# Patient Record
Sex: MALE | Race: WHITE | Employment: OTHER | URBAN - METROPOLITAN AREA
[De-identification: names, ages, dates, MRNs, and addresses within clinical notes are randomized per-mention and may not be internally consistent; named-entity substitution may affect disease eponyms.]

---

## 2017-10-31 PROBLEM — G89.29 CHRONIC BACK PAIN: Status: ACTIVE | Noted: 2017-10-31

## 2017-10-31 PROBLEM — M54.9 CHRONIC BACK PAIN: Status: ACTIVE | Noted: 2017-10-31

## 2022-05-16 ENCOUNTER — TELEPHONE (OUTPATIENT)
Dept: ADMINISTRATIVE | Age: 72
End: 2022-05-16

## 2022-05-16 NOTE — TELEPHONE ENCOUNTER
Patient Appointment Form:      PCP: Carlos A Pham  Referring: Carlos A Pham    Has the Patient:    Seen a Cardiologist? yes    date:2017  [de-identified]  location:[place]    Had a heart catheterization? no    Had heart surgery?  yes   date:  1/28/2010   surgeon: Sanam Mason   [facility] name:  [facility]    Had a stress test or nuclear stress test? yes   date: feb 2012   [facility]    Had an echocardiogram? yes   date: feb 2012   [facility]    Had a vascular ultrasound? no    Had a 24/48 heart monitor or extended cardiac event monitor? no    Had recent blood work in the last 6 months? yes    date: 5/10/2022    ordering physician: Carlos A Pham    Had a pacemaker/ICD/ILR implant? no    Seen an Electrophysiologist? no        Will send records via: fax      Date & time of appointment:  6/23/2022  10:40 Dr Ybarra Pleasant

## 2022-06-22 ENCOUNTER — TELEPHONE (OUTPATIENT)
Dept: ADMINISTRATIVE | Age: 72
End: 2022-06-22

## 2022-08-26 ENCOUNTER — OFFICE VISIT (OUTPATIENT)
Dept: CARDIOLOGY CLINIC | Age: 72
End: 2022-08-26
Payer: MEDICARE

## 2022-08-26 VITALS
SYSTOLIC BLOOD PRESSURE: 120 MMHG | RESPIRATION RATE: 16 BRPM | HEART RATE: 52 BPM | WEIGHT: 134.6 LBS | HEIGHT: 67 IN | BODY MASS INDEX: 21.12 KG/M2 | DIASTOLIC BLOOD PRESSURE: 80 MMHG

## 2022-08-26 DIAGNOSIS — R01.1 MURMUR: ICD-10-CM

## 2022-08-26 DIAGNOSIS — I25.10 CORONARY ARTERY DISEASE INVOLVING NATIVE CORONARY ARTERY OF NATIVE HEART WITHOUT ANGINA PECTORIS: Primary | ICD-10-CM

## 2022-08-26 DIAGNOSIS — R06.09 DOE (DYSPNEA ON EXERTION): ICD-10-CM

## 2022-08-26 PROCEDURE — 99204 OFFICE O/P NEW MOD 45 MIN: CPT | Performed by: INTERNAL MEDICINE

## 2022-08-26 PROCEDURE — 93000 ELECTROCARDIOGRAM COMPLETE: CPT | Performed by: INTERNAL MEDICINE

## 2022-08-26 NOTE — PROGRESS NOTES
Ladarius Samson  1950  Date of Service: 8/26/2022    Reason for Consultation: We were asked to see Ladarius Samson by Dr. Brunilda Starks MD  regarding dyspnea, CAD. History of Chief Complaint: This is a 66 y.o. male with a history of coronary artery disease. He has stents in the circumflex and RCA. He also has a history of hypertension, diabetes, hypercholesterolemia, lumbar stenosis, and a carotid endarterectomy. He states that he has noticed dyspnea on exertion with performing higher levels of physical activities over the past 1 to 2 months. He denies any chest discomfort, orthopnea/PND. He denies any palpitations, or syncope, or near syncope. REVIEW OF SYSTEMS:  As above. See patient questionair for further review of systems. CURRENT MEDICATIONS:  Current Outpatient Medications   Medication Sig Dispense Refill    HYDROcodone-acetaminophen (NORCO)  MG per tablet Take 1 tablet by mouth 5 times daily for 30 days. 150 tablet 0    cetirizine (ZYRTEC) 10 MG tablet Take 10 mg by mouth daily      famotidine (PEPCID) 20 MG tablet TAKE ONE TABLET BY MOUTH TWICE A DAY AS NEEDED WITH FOOD FOR STOMACH- REPLACES RANITIDINE      amLODIPine (NORVASC) 2.5 MG tablet Take 2.5 mg by mouth in the morning and at bedtime      sildenafil (VIAGRA) 100 MG tablet Take 100 mg by mouth as needed for Erectile Dysfunction      cyclobenzaprine (FLEXERIL) 10 MG tablet TAKE ONE TABLET BY MOUTH THREE TIMES A DAY AS NEEDED  1    Tens Unit MISC by Does not apply route 1 tens unit with supplies. DX: M48.061 M54.9 1 each 0    Cholecalciferol (VITAMIN D PO) Take 1 tablet by mouth daily      aspirin 81 MG tablet Take 81 mg by mouth in the morning and at bedtime      metoprolol (TOPROL XL) 25 MG XL tablet Take 1 tablet by mouth daily. (Patient taking differently: Take 12.5 mg by mouth 2 times daily) 90 tablet 3    atorvastatin (LIPITOR) 80 MG tablet Take 80 mg by mouth daily.       metFORMIN (GLUCOPHAGE) 500 MG tablet or lymphadenopathy. HEART:  Regular rate and rhythm. Normal S1 and S2. There is a mid peaking 2/6 systolic ejection murmur. LUNGS:  Clear to auscultation bilaterally. No use of accessory muscles. ABDOMEN:  Soft, non-tender. Normal bowel sounds. EXTREMITIES:  Full ROM x4. No bilateral lower extremity edema. Good distal pulses. EYES:  PERRL, normal lids & conjunctiva. No icterus. ENT: no external masses, no bleeding. Moist mucosa. Normal lips formation. NEURO: Full ROM x 4, EOMI, no tremors. SKIN:  Warm, dry and intact. Normal turgor, no petechia. Phych: alert & oriented x3. Normal  Judgement & insight. Currently not agitated or anxious. EKG: Sinus bradycardia, 52 bpm, nl axis, nonspecific ST - T wave changes. Assessment:   Dyspnea on exertion  Murmur consistent with aortic stenosis  Coronary artery disease hypertension, well controlled today. Diabetes  Lumbar stenosis  Right carotid endarterectomy      Recommendations:  Echocardiogram  Lexiscan Cardiolite stress test.    Thank you for allowing me to participate in your patient's care. [address] [facility] [place], [facility]  Interventional Cardiology    Note: This report was completed using computerized voice recognition software. Every effort has been made to ensure accuracy, however; and invert and computerized transcription errors may be present.

## 2022-10-05 ENCOUNTER — TELEPHONE (OUTPATIENT)
Dept: CARDIOLOGY | Age: 72
End: 2022-10-05

## 2022-10-05 NOTE — TELEPHONE ENCOUNTER
Spoke with patient and confirmed Lexiscan stress test appointment on October 7, 2022 at 0700. Instructions for test,hold Norvasc,Toprol and glucophage, and COVID-19 preprocedure information reviewed with patient, questions answered. Patient verbalized understanding.

## 2022-10-07 ENCOUNTER — TELEPHONE (OUTPATIENT)
Dept: CARDIOLOGY CLINIC | Age: 72
End: 2022-10-07

## 2022-10-07 ENCOUNTER — HOSPITAL ENCOUNTER (OUTPATIENT)
Dept: CARDIOLOGY | Age: 72
Discharge: HOME | End: 2022-10-07
Payer: MEDICARE

## 2022-10-07 VITALS
RESPIRATION RATE: 12 BRPM | WEIGHT: 134 LBS | HEART RATE: 64 BPM | HEIGHT: 67 IN | SYSTOLIC BLOOD PRESSURE: 138 MMHG | BODY MASS INDEX: 21.03 KG/M2 | DIASTOLIC BLOOD PRESSURE: 70 MMHG

## 2022-10-07 DIAGNOSIS — R01.1 MURMUR: ICD-10-CM

## 2022-10-07 DIAGNOSIS — I25.10 CORONARY ARTERY DISEASE INVOLVING NATIVE CORONARY ARTERY OF NATIVE HEART WITHOUT ANGINA PECTORIS: ICD-10-CM

## 2022-10-07 DIAGNOSIS — R06.09 DOE (DYSPNEA ON EXERTION): ICD-10-CM

## 2022-10-07 LAB
LV EF: 60 %
LVEF MODALITY: NORMAL

## 2022-10-07 PROCEDURE — 2580000003 HC RX 258: Performed by: INTERNAL MEDICINE

## 2022-10-07 PROCEDURE — 78452 HT MUSCLE IMAGE SPECT MULT: CPT

## 2022-10-07 PROCEDURE — 93017 CV STRESS TEST TRACING ONLY: CPT

## 2022-10-07 PROCEDURE — A9500 TC99M SESTAMIBI: HCPCS | Performed by: INTERNAL MEDICINE

## 2022-10-07 PROCEDURE — 6360000002 HC RX W HCPCS: Performed by: INTERNAL MEDICINE

## 2022-10-07 PROCEDURE — 3430000000 HC RX DIAGNOSTIC RADIOPHARMACEUTICAL: Performed by: INTERNAL MEDICINE

## 2022-10-07 PROCEDURE — 93306 TTE W/DOPPLER COMPLETE: CPT

## 2022-10-07 RX ORDER — SODIUM CHLORIDE 0.9 % (FLUSH) 0.9 %
10 SYRINGE (ML) INJECTION PRN
Status: DISCONTINUED | OUTPATIENT
Start: 2022-10-07 | End: 2022-10-08 | Stop reason: HOSPADM

## 2022-10-07 RX ADMIN — REGADENOSON 0.4 MG: 0.08 INJECTION, SOLUTION INTRAVENOUS at 08:45

## 2022-10-07 RX ADMIN — Medication 9.8 MILLICURIE: at 07:11

## 2022-10-07 RX ADMIN — SODIUM CHLORIDE, PRESERVATIVE FREE 10 ML: 5 INJECTION INTRAVENOUS at 08:46

## 2022-10-07 RX ADMIN — SODIUM CHLORIDE, PRESERVATIVE FREE 10 ML: 5 INJECTION INTRAVENOUS at 08:45

## 2022-10-07 RX ADMIN — SODIUM CHLORIDE, PRESERVATIVE FREE 10 ML: 5 INJECTION INTRAVENOUS at 07:11

## 2022-10-07 RX ADMIN — Medication 31.6 MILLICURIE: at 08:45

## 2022-10-07 NOTE — DISCHARGE INSTRUCTIONS
[address]      Instructions to Patients    The following are the instructions for patients who have had a procedure in our office today. Patient name: Codie Cody    Radionuclide Activity: 40mCi of 99mTc-Tetrofosmin    Date Administered: 10/7/2022    Expires: 48 hours after scheduled appointment time      Patient may resume normal activity unless otherwise instructed. Patient may resume medications as normal.  If the need should arise, patient may call [phone] between the hours of 7:00am-3:00pm.  After hours there is at least one physician on-call at all times for those patients needing assistance. Patients may call [phone] and the answering service will direct the patient to one of our physicians for assistance. After the patient's test if they are going to be leaving from an airport in the near future they should take this letter with them to verify the test and radionuclide used for their test.      This letter verifies that the above named bearer received an injection of a radionuclide for medical purpose/usage only.         Electronically signed by Letitia Nam on 10/7/2022 at 8:45 AM

## 2022-10-07 NOTE — PROCEDURES
[address],[facility] [address] [phone]                Pharmacologic Stress Nuclear Gated SPECT Study    Name: Soledad Castillo. Account Number: [de-identified]    :  1950          Sex: male         Date of Study:  10/7/2022    Height: 5' 7\" (170.2 cm)         Weight: 134 lb (60.8 kg)     Ordering Provider: Channing Cuenca DO          PCP: Eda Lu MD      Cardiologist: Channing Cuenca DO             Interpreting Physician: Channing Cuenca DO  _________________________________________________________________________________    Indication:   Evaluation of extent and severity of coronary artery disease    Clinical History:   Patient has prior history of coronary artery disease. Resting ECG:    Sinus rhythm, 59 bpm.  Normal axis. Nonspecific ST-T waves. Procedure:   Pharmacologic stress testing was performed with regadenoson 0.4 mg for 15 seconds. The heart rate was 59 at baseline and rin to 101 beats during the infusion. This corresponds to 69% of maximum predicted heart rate. The blood pressure at baseline was 138/70 and blood pressure at the end of infusion was 122/74. Blood pressure response was normal during the stress procedure. The patient experienced mild shortness of breath and lightheadedness during the infusion. ECG during the infusion did not change. IMAGING: Myocardial perfusion imaging was performed at rest 30-35 minutes following the intravenous injection of 9.8 mCi of (Tc-Sestamibi) followed by 10 ml of Normal Saline. As per infusion protocol, the patient was injected intravenously with 31.6 mCi of (Tc-Sestamibi) followed by 10 ml of Normal Saline. Gated post-stress tomographic imaging was performed 20-25 minutes after stress. FINDINGS: The overall quality of the study was fair.      Left ventricular cavity size was noted to be normal.    Rotational analog analysis demonstrated no patient motion or abnormal extracardiac radioactivity and soft tissue diaphragmatic attenuation. The gated SPECT stress imaging in the short, vertical long, and horizontal long axis demonstrated     A moderate defect was present in the basal inferoseptal and mid inferoseptal wall(s) that was  moderate sized by quantification. The resting images show no change. Gated SPECT left ventricular ejection fraction was calculated to be 66%, with normal myocardial thickening and wall motion. Impression:    ECG during the infusion did not change. The myocardial perfusion imaging was normal with attenuation artifact. Overall left ventricular systolic function was normal without regional wall motion abnormalities. Low risk general pharmacologic stress test.    Thank you for sending your patient to this [employer].      Electronically signed by Rosemary Ruiz DO on 10/7/22 at 1:03 PM EDT

## 2022-10-10 ENCOUNTER — TELEPHONE (OUTPATIENT)
Dept: CARDIOLOGY CLINIC | Age: 72
End: 2022-10-10

## 2022-10-10 PROBLEM — I35.0 NONRHEUMATIC AORTIC VALVE STENOSIS: Status: ACTIVE | Noted: 2022-10-10

## 2022-10-10 RX ORDER — METOPROLOL SUCCINATE 25 MG/1
12.5 TABLET, EXTENDED RELEASE ORAL DAILY
COMMUNITY

## 2022-10-10 NOTE — TELEPHONE ENCOUNTER
----- Message from Matthew Barriga DO sent at 10/10/2022 10:18 AM EDT -----  I discussed his echo results with him. He has moderate aortic stenosis. He is to decrease his metoprolol from 12.5 mg twice a day to 12.5 mg once a day. He should follow-up in 3 months.

## 2023-01-17 ENCOUNTER — TELEPHONE (OUTPATIENT)
Dept: CARDIOLOGY CLINIC | Age: 73
End: 2023-01-17

## 2023-01-17 ENCOUNTER — OFFICE VISIT (OUTPATIENT)
Dept: CARDIOLOGY CLINIC | Age: 73
End: 2023-01-17
Payer: MEDICARE

## 2023-01-17 VITALS
SYSTOLIC BLOOD PRESSURE: 142 MMHG | DIASTOLIC BLOOD PRESSURE: 78 MMHG | HEIGHT: 67 IN | WEIGHT: 143 LBS | HEART RATE: 62 BPM | BODY MASS INDEX: 22.44 KG/M2

## 2023-01-17 DIAGNOSIS — I25.10 CORONARY ARTERY DISEASE INVOLVING NATIVE CORONARY ARTERY OF NATIVE HEART WITHOUT ANGINA PECTORIS: Primary | ICD-10-CM

## 2023-01-17 PROCEDURE — 1123F ACP DISCUSS/DSCN MKR DOCD: CPT | Performed by: INTERNAL MEDICINE

## 2023-01-17 PROCEDURE — 99214 OFFICE O/P EST MOD 30 MIN: CPT | Performed by: INTERNAL MEDICINE

## 2023-01-17 PROCEDURE — 93000 ELECTROCARDIOGRAM COMPLETE: CPT | Performed by: INTERNAL MEDICINE

## 2023-01-17 PROCEDURE — 3077F SYST BP >= 140 MM HG: CPT | Performed by: INTERNAL MEDICINE

## 2023-01-17 PROCEDURE — 3078F DIAST BP <80 MM HG: CPT | Performed by: INTERNAL MEDICINE

## 2023-01-17 NOTE — TELEPHONE ENCOUNTER
Per Dr. Judy Grant, patient needs referral to pulmonary of PCP's choice re: COPD. Left message for medical staff at Dr. Bunny Melendrez office to call me.

## 2023-01-18 NOTE — PROGRESS NOTES
Kelly Mortensen  1950  Date of Service: 1/17/2023    Patient Active Problem List    Diagnosis Date Noted    Nonrheumatic aortic valve stenosis 10/10/2022     Priority: Medium     Overview Note:     Moderate on echo 10-22. Chronic back pain 10/31/2017    Spondylolisthesis of lumbar region 07/14/2016    HTN (hypertension)     Agent orange exposure     Lumbar spinal stenosis L3-4, L4-5, L5-S1 05/30/2013    Synovial cyst of lumbar spine L4-5 left 05/30/2013    PVD (peripheral vascular disease) ([address]      Overview Note:     Right carotid endarterectomy 10/3/07, left carotid occlusion. CAD (coronary artery disease)      Overview Note:     Adenosine infused stress test October 4, 2007, negative for ischemia. Status post cardiac catheterization, Dr. Geremias Hanley 12/16/08: coronary artery disease. Left main, mild luminal irregularities. Left anterior descending: mid vessel 30-40% stenosis  Left circumflex: proximal 80-90% stenosis. Right coronary artery: proximal 70% stenosis with scattered 30-40% stenosis in a codominant vessel. 2008: Successful PTCA, left circumflex proximal 80-90% stenosis to residual normal appearing lumen placing a 3.0x15 mm long [place] drug-eluting stent. Right coronary artery proximal 70% stenosis to residual normal appearing lumen placing a 2.5x12 mm long [place] drug-eluting stent. (1-28-10) 2.5 mm RUKHSANA mid RCA      Hyperlipidemia     Smoker     Displacement of cervical intervertebral disc without myelopathy 12/06/2004    Spinal stenosis in cervical region 12/06/2004       Social History     Socioeconomic History    Marital status:    Tobacco Use    Smoking status: Every Day     Packs/day: 1.00     Types: Cigarettes    Smokeless tobacco: Never   Vaping Use    Vaping Use: Never used   Substance and Sexual Activity    Alcohol use: Yes     Alcohol/week: 3.0 standard drinks     Types: 3 Standard drinks or equivalent per week     Comment: very little    Drug use:  No Current Outpatient Medications   Medication Sig Dispense Refill    HYDROcodone-acetaminophen (NORCO)  MG per tablet Take 1 tablet by mouth 5 times daily for 30 days. 150 tablet 0    metoprolol succinate (TOPROL XL) 25 MG extended release tablet Take 12.5 mg by mouth daily      famotidine (PEPCID) 20 MG tablet TAKE ONE TABLET BY MOUTH TWICE A DAY AS NEEDED WITH FOOD FOR STOMACH- REPLACES RANITIDINE      amLODIPine (NORVASC) 2.5 MG tablet Take 2.5 mg by mouth in the morning and at bedtime      sildenafil (VIAGRA) 100 MG tablet Take 100 mg by mouth as needed for Erectile Dysfunction      cyclobenzaprine (FLEXERIL) 10 MG tablet TAKE ONE TABLET BY MOUTH THREE TIMES A DAY AS NEEDED  1    Tens Unit MISC by Does not apply route 1 tens unit with supplies. DX: M48.061 M54.9 1 each 0    Cholecalciferol (VITAMIN D PO) Take 1 tablet by mouth daily      aspirin 81 MG tablet Take 81 mg by mouth in the morning and at bedtime      atorvastatin (LIPITOR) 80 MG tablet Take 80 mg by mouth daily. metFORMIN (GLUCOPHAGE) 500 MG tablet Take 500 mg by mouth 2 times daily (with meals) Indications: takes 1/2 of a pill bid      gabapentin (NEURONTIN) 300 MG capsule Take 300 mg by mouth 3 times daily. clopidogrel (PLAVIX) 75 MG tablet Take 75 mg by mouth daily. No current facility-administered medications for this visit. Allergies   Allergen Reactions    Simvastatin Myalgia       Chief Complaint:  Nain Guevara is here today for follow up and management/recomendations for aortic stenosis, coronary artery disease. History of Present Illness: Nain Guevara states that He does household chores, goes up the stairs, does yard work & goes shopping. He continues to have the same chronic dyspnea on exertion that has not changed. He states that he had the flu about 1-1/2 weeks ago. Since then he has had some dizziness especially when looking upward or standing up.   He denies any chest discomfort, orthopnea/PND, or lower extremity edema. He denies any palpitations. REVIEW OF SYSTEMS:  As above. Patient does not complain of any fever, chills, nausea, vomiting or diarrhea. No focal, motor or neurological deficits. No changes in his/her vision, hearing, bowel or bladder habits. He is not known to have a history of thyroid problems. No recent nose bleeds. PHYSICAL EXAM:  Vitals:    01/17/23 0922   BP: (!) 142/78   Pulse: 62   Weight: 143 lb (64.9 kg)   Height: 5' 7\" (1.702 m)       GENERAL:  He is alert and oriented x 3, communicates well, in no distress. NECK:  No masses, trachea is mid position. Supple, full ROM, no JVD or bruits. No palpable thyromegaly or lymphadenopathy. HEART:  Regular rate and rhythm. Normal S1 and S2. There is a 1-2 over 6 mid peaking systolic ejection murmur. LUNGS:  Clear to auscultation bilaterally. Poor air movement. No use of accessory muscles. symmetrical excursion. ABDOMEN:  Soft, non-tender. Normal bowel sounds. EXTREMITIES:  Full ROM x 4. No bilateral lower extremity edema. Good distal pulses. EYES:  Extraocular muscles intact. PERRL. Normal lids & conjunctiva. ENT:  Nares are clear & not bleeding. Moist mucosa. Normal lips formation. No external masses   NEURO: no tremors, full ROM x 4, EOMI. SKIN:  Warm, dry and intact. Normal turgor. EKG: Sinus rhythm, 62 bpm, nl axis, nonspecific ST - T wave changes. Assessment:   Coronary artery disease as outlined above. Clinically no symptoms of recurring ischemia at this time. Moderate aortic stenosis  Hypertension, well controled at this time. Hypercholesterolemia  Right carotid endarterectomy. He states that the left carotid artery is closed. Agent orange exposure  COPD on exam.  He also showed me a CAT scan of his chest report that he just had done. This states at least moderate emphysema.   He does not move air well on exam.  His pulmonary pressures are moderately elevated on his echo.  He also states that he recently had pulmonary function tests performed at the [place] and was told that those were abnormal as well. Recommendations:  I discussed with him that I would like his blood pressure to be a little bit lower. However, I will not lower the blood pressure while he still having some dizziness. I suspect the dizziness when he looks upward is residual from his flu. We discussed that if this does not resolve over time he may consider seeing ENT. I was going to order PFTs on him. That is what he told me that he just had PFTs performed at the [place] and they were abnormal.  With his poor air movement on exam, at least moderate emphysema on his chest CT, and abnormal PFTs I discussed with him that I would recommend seeing pulmonology for his persistent dyspnea. He does have moderate aortic stenosis but it is only moderate and the mean gradient is only 18 mmHg. Thank you for allowing me to participate in your patient's care. [address], [address] [facility]  Interventional Cardiology    Note: This report was completed using computerized voice recognition software. Every effort has been made to ensure accuracy, however; and invert and computerized transcription errors may be present.

## 2023-01-24 NOTE — TELEPHONE ENCOUNTER
Michelle in Dr. Amalia Evans notified of Dr. Dante Badillo recommendation. Notes faxed to [phone] .

## 2023-04-26 ENCOUNTER — OFFICE VISIT (OUTPATIENT)
Dept: CARDIOLOGY CLINIC | Age: 73
End: 2023-04-26
Payer: MEDICARE

## 2023-04-26 VITALS
SYSTOLIC BLOOD PRESSURE: 132 MMHG | HEART RATE: 55 BPM | RESPIRATION RATE: 14 BRPM | HEIGHT: 68 IN | WEIGHT: 142 LBS | BODY MASS INDEX: 21.52 KG/M2 | DIASTOLIC BLOOD PRESSURE: 70 MMHG

## 2023-04-26 DIAGNOSIS — I25.10 CORONARY ARTERY DISEASE INVOLVING NATIVE CORONARY ARTERY OF NATIVE HEART WITHOUT ANGINA PECTORIS: Primary | Chronic | ICD-10-CM

## 2023-04-26 PROCEDURE — 93000 ELECTROCARDIOGRAM COMPLETE: CPT | Performed by: INTERNAL MEDICINE

## 2023-04-26 PROCEDURE — 1123F ACP DISCUSS/DSCN MKR DOCD: CPT | Performed by: INTERNAL MEDICINE

## 2023-04-26 PROCEDURE — 99214 OFFICE O/P EST MOD 30 MIN: CPT | Performed by: INTERNAL MEDICINE

## 2023-04-26 PROCEDURE — 3078F DIAST BP <80 MM HG: CPT | Performed by: INTERNAL MEDICINE

## 2023-04-26 PROCEDURE — 3075F SYST BP GE 130 - 139MM HG: CPT | Performed by: INTERNAL MEDICINE

## 2023-04-26 RX ORDER — MAGNESIUM OXIDE 420 MG/1
1 TABLET ORAL DAILY
COMMUNITY
Start: 2023-04-10

## 2023-04-26 RX ORDER — AMLODIPINE BESYLATE 5 MG/1
7.5 TABLET ORAL DAILY
Qty: 135 TABLET | Refills: 3 | Status: SHIPPED | OUTPATIENT
Start: 2023-04-26

## 2023-04-26 RX ORDER — UMECLIDINIUM BROMIDE AND VILANTEROL TRIFENATATE 62.5; 25 UG/1; UG/1
1 POWDER RESPIRATORY (INHALATION) DAILY
COMMUNITY

## 2023-04-26 NOTE — PROGRESS NOTES
Chief Complaint:  Matt Chery is here today for follow up and management/recomendations for aortic stenosis, coronary artery disease. History of Present Illness: Matt Chery states that He does household chores, goes up the stairs, does yard work & goes shopping. He continues to have the same chronic dyspnea on exertion with higher levels of activities that has not changed. He states that this has not changed for years. He denies any chest discomfort, orthopnea/PND, or lower extremity edema. He denies any palpitations. REVIEW OF SYSTEMS:  As above. Patient does not complain of any fever, chills, nausea, vomiting or diarrhea. No focal, motor or neurological deficits. No changes in his/her vision, hearing, bowel or bladder habits. He is not known to have a history of thyroid problems. No recent nose bleeds. PHYSICAL EXAM:  Vitals:    04/26/23 0754   BP: 132/70   Pulse: 55   Resp: 14   Weight: 142 lb (64.4 kg)   Height: 5' 8\" (1.727 m)       GENERAL:  He is alert and oriented x 3, communicates well, in no distress. NECK:  No masses, trachea is mid position. Supple, full ROM, no JVD. Left greater than right bruits. No palpable thyromegaly or lymphadenopathy. HEART:  Regular rate and rhythm. Normal S1 and S2. There is a I-II/VI mid peaking systolic ejection murmur. LUNGS:  Clear to auscultation bilaterally. Poor air movement. No use of accessory muscles. symmetrical excursion. ABDOMEN:  Soft, non-tender. Normal bowel sounds. EXTREMITIES:  Full ROM x 4. No bilateral lower extremity edema on exam.  Good distal pulses. EYES:  Extraocular muscles intact. PERRL. Normal lids & conjunctiva. ENT:  Nares are clear & not bleeding. Moist mucosa. Normal lips formation. No external masses   NEURO: no tremors, full ROM x 4, EOMI. SKIN:  Warm, dry and intact. Normal turgor. EKG: Sinus rhythm, 55 bpm, nl axis, nonspecific ST - T wave changes.       Assessment:   Coronary artery

## 2023-05-28 ENCOUNTER — APPOINTMENT (OUTPATIENT)
Dept: GENERAL RADIOLOGY | Age: 73
End: 2023-05-28
Payer: MEDICARE

## 2023-05-28 ENCOUNTER — HOSPITAL ENCOUNTER (EMERGENCY)
Age: 73
Discharge: HOME | End: 2023-05-29
Payer: MEDICARE

## 2023-05-28 DIAGNOSIS — J18.9 PNEUMONIA OF RIGHT LUNG DUE TO INFECTIOUS ORGANISM, UNSPECIFIED PART OF LUNG: Primary | ICD-10-CM

## 2023-05-28 PROCEDURE — 99284 EMERGENCY DEPT VISIT MOD MDM: CPT

## 2023-05-28 PROCEDURE — 71046 X-RAY EXAM CHEST 2 VIEWS: CPT

## 2023-05-28 PROCEDURE — 87635 SARS-COV-2 COVID-19 AMP PRB: CPT

## 2023-05-28 PROCEDURE — 87502 INFLUENZA DNA AMP PROBE: CPT

## 2023-05-28 PROCEDURE — 6370000000 HC RX 637 (ALT 250 FOR IP): Performed by: NURSE PRACTITIONER

## 2023-05-28 PROCEDURE — 94664 DEMO&/EVAL PT USE INHALER: CPT

## 2023-05-28 PROCEDURE — 87880 STREP A ASSAY W/OPTIC: CPT

## 2023-05-28 RX ORDER — IPRATROPIUM BROMIDE AND ALBUTEROL SULFATE 2.5; .5 MG/3ML; MG/3ML
1 SOLUTION RESPIRATORY (INHALATION) ONCE
Status: COMPLETED | OUTPATIENT
Start: 2023-05-28 | End: 2023-05-28

## 2023-05-28 RX ORDER — ACETAMINOPHEN 325 MG/1
650 TABLET ORAL ONCE
Status: DISCONTINUED | OUTPATIENT
Start: 2023-05-28 | End: 2023-05-29 | Stop reason: HOSPADM

## 2023-05-28 RX ORDER — 0.9 % SODIUM CHLORIDE 0.9 %
500 INTRAVENOUS SOLUTION INTRAVENOUS ONCE
Status: COMPLETED | OUTPATIENT
Start: 2023-05-28 | End: 2023-05-29

## 2023-05-28 RX ADMIN — IPRATROPIUM BROMIDE AND ALBUTEROL SULFATE 1 DOSE: 2.5; .5 SOLUTION RESPIRATORY (INHALATION) at 23:37

## 2023-05-29 VITALS
BODY MASS INDEX: 20.46 KG/M2 | RESPIRATION RATE: 16 BRPM | HEIGHT: 68 IN | TEMPERATURE: 97.8 F | DIASTOLIC BLOOD PRESSURE: 60 MMHG | HEART RATE: 75 BPM | WEIGHT: 135 LBS | SYSTOLIC BLOOD PRESSURE: 114 MMHG | OXYGEN SATURATION: 96 %

## 2023-05-29 LAB
ALBUMIN SERPL-MCNC: 3.9 G/DL (ref 3.5–5.2)
ALP SERPL-CCNC: 154 U/L (ref 40–129)
ALT SERPL-CCNC: 9 U/L (ref 0–40)
ANION GAP SERPL CALCULATED.3IONS-SCNC: 14 MMOL/L (ref 7–16)
AST SERPL-CCNC: 13 U/L (ref 0–39)
BASOPHILS # BLD: 0.03 E9/L (ref 0–0.2)
BASOPHILS NFR BLD: 0.2 % (ref 0–2)
BILIRUB SERPL-MCNC: 0.6 MG/DL (ref 0–1.2)
BUN SERPL-MCNC: 21 MG/DL (ref 6–23)
CALCIUM SERPL-MCNC: 9.3 MG/DL (ref 8.6–10.2)
CHLORIDE SERPL-SCNC: 105 MMOL/L (ref 98–107)
CO2 SERPL-SCNC: 20 MMOL/L (ref 22–29)
CREAT SERPL-MCNC: 1.2 MG/DL (ref 0.7–1.2)
EOSINOPHIL # BLD: 0.03 E9/L (ref 0.05–0.5)
EOSINOPHIL NFR BLD: 0.2 % (ref 0–6)
ERYTHROCYTE [DISTWIDTH] IN BLOOD BY AUTOMATED COUNT: 13.9 FL (ref 11.5–15)
GLUCOSE SERPL-MCNC: 138 MG/DL (ref 74–99)
HCT VFR BLD AUTO: 40.8 % (ref 37–54)
HGB BLD-MCNC: 13.5 G/DL (ref 12.5–16.5)
IMM GRANULOCYTES # BLD: 0.05 E9/L
IMM GRANULOCYTES NFR BLD: 0.4 % (ref 0–5)
INFLUENZA A BY PCR: NOT DETECTED
INFLUENZA B BY PCR: NOT DETECTED
LACTATE BLDV-SCNC: 0.9 MMOL/L (ref 0.5–2.2)
LYMPHOCYTES # BLD: 1.83 E9/L (ref 1.5–4)
LYMPHOCYTES NFR BLD: 13.2 % (ref 20–42)
MCH RBC QN AUTO: 30.5 PG (ref 26–35)
MCHC RBC AUTO-ENTMCNC: 33.1 % (ref 32–34.5)
MCV RBC AUTO: 92.1 FL (ref 80–99.9)
MONOCYTES # BLD: 0.86 E9/L (ref 0.1–0.95)
MONOCYTES NFR BLD: 6.2 % (ref 2–12)
NEUTROPHILS # BLD: 11.1 E9/L (ref 1.8–7.3)
NEUTS SEG NFR BLD: 79.8 % (ref 43–80)
PLATELET # BLD AUTO: 297 E9/L (ref 130–450)
PMV BLD AUTO: 9.3 FL (ref 7–12)
POTASSIUM SERPL-SCNC: 4.9 MMOL/L (ref 3.5–5)
PROT SERPL-MCNC: 6.9 G/DL (ref 6.4–8.3)
RBC # BLD AUTO: 4.43 E12/L (ref 3.8–5.8)
SARS-COV-2 RDRP RESP QL NAA+PROBE: NOT DETECTED
SODIUM SERPL-SCNC: 139 MMOL/L (ref 132–146)
STREP GRP A PCR: NEGATIVE
WBC # BLD: 13.9 E9/L (ref 4.5–11.5)

## 2023-05-29 PROCEDURE — 85025 COMPLETE CBC W/AUTO DIFF WBC: CPT

## 2023-05-29 PROCEDURE — 83605 ASSAY OF LACTIC ACID: CPT

## 2023-05-29 PROCEDURE — 2580000003 HC RX 258: Performed by: NURSE PRACTITIONER

## 2023-05-29 PROCEDURE — 80053 COMPREHEN METABOLIC PANEL: CPT

## 2023-05-29 PROCEDURE — 6370000000 HC RX 637 (ALT 250 FOR IP): Performed by: NURSE PRACTITIONER

## 2023-05-29 RX ORDER — BENZONATATE 100 MG/1
100 CAPSULE ORAL ONCE
Status: COMPLETED | OUTPATIENT
Start: 2023-05-29 | End: 2023-05-29

## 2023-05-29 RX ORDER — AZITHROMYCIN 250 MG/1
500 TABLET, FILM COATED ORAL ONCE
Status: COMPLETED | OUTPATIENT
Start: 2023-05-29 | End: 2023-05-29

## 2023-05-29 RX ORDER — AZITHROMYCIN 250 MG/1
250 TABLET, FILM COATED ORAL DAILY
Qty: 4 TABLET | Refills: 0 | Status: SHIPPED | OUTPATIENT
Start: 2023-05-29 | End: 2023-06-02

## 2023-05-29 RX ORDER — BENZONATATE 100 MG/1
100 CAPSULE ORAL 3 TIMES DAILY PRN
Qty: 21 CAPSULE | Refills: 0 | Status: SHIPPED | OUTPATIENT
Start: 2023-05-29 | End: 2023-06-05

## 2023-05-29 RX ORDER — AMOXICILLIN AND CLAVULANATE POTASSIUM 875; 125 MG/1; MG/1
1 TABLET, FILM COATED ORAL ONCE
Status: COMPLETED | OUTPATIENT
Start: 2023-05-29 | End: 2023-05-29

## 2023-05-29 RX ORDER — ALBUTEROL SULFATE 90 UG/1
2 AEROSOL, METERED RESPIRATORY (INHALATION) 4 TIMES DAILY PRN
Qty: 18 G | Refills: 0 | Status: SHIPPED | OUTPATIENT
Start: 2023-05-29

## 2023-05-29 RX ORDER — AMOXICILLIN AND CLAVULANATE POTASSIUM 875; 125 MG/1; MG/1
1 TABLET, FILM COATED ORAL 2 TIMES DAILY
Qty: 20 TABLET | Refills: 0 | Status: SHIPPED | OUTPATIENT
Start: 2023-05-29 | End: 2023-06-08

## 2023-05-29 RX ADMIN — SODIUM CHLORIDE 500 ML: 9 INJECTION, SOLUTION INTRAVENOUS at 00:34

## 2023-05-29 RX ADMIN — BENZONATATE 100 MG: 100 CAPSULE ORAL at 02:00

## 2023-05-29 RX ADMIN — AZITHROMYCIN 500 MG: 250 TABLET, FILM COATED ORAL at 02:00

## 2023-05-29 RX ADMIN — AMOXICILLIN AND CLAVULANATE POTASSIUM 1 TABLET: 875; 125 TABLET, FILM COATED ORAL at 02:00

## 2023-05-29 ASSESSMENT — LIFESTYLE VARIABLES
HOW OFTEN DO YOU HAVE A DRINK CONTAINING ALCOHOL: NEVER
HOW MANY STANDARD DRINKS CONTAINING ALCOHOL DO YOU HAVE ON A TYPICAL DAY: PATIENT DOES NOT DRINK

## 2023-05-29 NOTE — ED PROVIDER NOTES
Independent [facility] Visit. [facility]  ED  Encounter Note  Admit Date/RoomTime: 2023 11:08 PM  ED Room:   NAME: Ursula Pizarro  : 1950  MRN: [id number]  PCP: Patrick Stroud MD    CHIEF COMPLAINT     Fever, Cough, and Chills    HISTORY OF PRESENT ILLNESS        Ursula Pizarro is a 74 y.o. male who presents to the ED via private vehicle with significant other with a complaint of cough onset Thursday developing a fever and chills today. Temperature at home max of 101. Cough productive for yellow to green mucus. Does have mild nasal congestion, sore throat as well as some generalized myalgias. Reports that the recently returned home from a cruise. Denies chest pain. Not particularly short of breath. No nausea or vomiting. Does feel somewhat fatigued. REVIEW OF SYSTEMS     Pertinent positives and negatives are stated within HPI, all other systems reviewed and are negative. Past Medical History:  has a past medical history of Agent orange exposure, CAD (coronary artery disease), Fracture, ribs, HTN (hypertension), Hyperlipidemia, Lumbar spinal stenosis, PVD (peripheral vascular disease) ([facility] [address] Smoker, Synovial cyst, and Type II or unspecified type diabetes mellitus without mention of complication, not stated as uncontrolled. Surgical History:  has a past surgical history that includes Carotid endarterectomy (10/03/2007); Diagnostic Cardiac Cath Lab Procedure (2008); Coronary angioplasty (2008); Vasectomy; Neck surgery; and Nose surgery. Social History:  reports that he has been smoking cigarettes. He has been smoking an average of 1 pack per day. He has never used smokeless tobacco. He reports current alcohol use of about 3.0 standard drinks per week. He reports that he does not use drugs. Family History: family history includes Heart Attack in his father.    Allergies: Simvastatin  CURRENT MEDICATIONS       Discharge

## 2023-05-29 NOTE — DISCHARGE INSTRUCTIONS
You should follow-up with your family medical doctor in 2 days for recheck. We would expect that you would begin to improve in 24 to 48 hours. You should not get worse once starting the medication.   If your symptoms are worsening or you are having any difficulty breathing or fevers uncontrolled please return immediately to the emergency department for reevaluation

## 2024-03-05 ENCOUNTER — OFFICE VISIT (OUTPATIENT)
Dept: CARDIOLOGY CLINIC | Age: 74
End: 2024-03-05
Payer: MEDICARE

## 2024-03-05 VITALS
SYSTOLIC BLOOD PRESSURE: 122 MMHG | HEART RATE: 64 BPM | DIASTOLIC BLOOD PRESSURE: 60 MMHG | BODY MASS INDEX: 21.56 KG/M2 | HEIGHT: 67 IN | RESPIRATION RATE: 16 BRPM | WEIGHT: 137.4 LBS

## 2024-03-05 DIAGNOSIS — I25.10 CORONARY ARTERY DISEASE INVOLVING NATIVE CORONARY ARTERY OF NATIVE HEART WITHOUT ANGINA PECTORIS: Primary | ICD-10-CM

## 2024-03-05 DIAGNOSIS — R06.09 DOE (DYSPNEA ON EXERTION): ICD-10-CM

## 2024-03-05 DIAGNOSIS — I35.0 NONRHEUMATIC AORTIC VALVE STENOSIS: ICD-10-CM

## 2024-03-05 PROCEDURE — 3078F DIAST BP <80 MM HG: CPT | Performed by: INTERNAL MEDICINE

## 2024-03-05 PROCEDURE — 99214 OFFICE O/P EST MOD 30 MIN: CPT | Performed by: INTERNAL MEDICINE

## 2024-03-05 PROCEDURE — 1123F ACP DISCUSS/DSCN MKR DOCD: CPT | Performed by: INTERNAL MEDICINE

## 2024-03-05 PROCEDURE — 3074F SYST BP LT 130 MM HG: CPT | Performed by: INTERNAL MEDICINE

## 2024-03-05 PROCEDURE — 93000 ELECTROCARDIOGRAM COMPLETE: CPT | Performed by: INTERNAL MEDICINE

## 2024-03-05 RX ORDER — FLUTICASONE FUROATE, UMECLIDINIUM BROMIDE AND VILANTEROL TRIFENATATE 100; 62.5; 25 UG/1; UG/1; UG/1
1 POWDER RESPIRATORY (INHALATION) DAILY
COMMUNITY

## 2024-03-05 RX ORDER — AMLODIPINE BESYLATE 10 MG/1
10 TABLET ORAL DAILY
COMMUNITY

## 2024-03-05 NOTE — PROGRESS NOTES
Jorge A Orona  1950  Date of Service: 3/5/2024    Patient Active Problem List    Diagnosis Date Noted    Nonrheumatic aortic valve stenosis 10/10/2022     Priority: Medium     Overview Note:     Moderate on echo 10-22.      Chronic back pain 10/31/2017    Spondylolisthesis of lumbar region 07/14/2016    HTN (hypertension)     Agent orange exposure     Lumbar spinal stenosis L3-4, L4-5, L5-S1 05/30/2013    Synovial cyst of lumbar spine L4-5 left 05/30/2013    PVD (peripheral vascular disease) ([facility])      Overview Note:     Right carotid endarterectomy 10/3/07, left carotid occlusion.      CAD (coronary artery disease)      Overview Note:     Adenosine infused stress test October 4, 2007, negative for ischemia.  Status post cardiac catheterization, Dr. Blue 12/16/08: coronary artery disease.  Left main, mild luminal irregularities.  Left anterior descending: mid vessel 30-40% stenosis  Left circumflex: proximal 80-90% stenosis.  Right coronary artery: proximal 70% stenosis with scattered 30-40% stenosis in a codominant vessel.  2008: Successful PTCA, left circumflex proximal 80-90% stenosis to residual normal appearing lumen placing a 3.0x15 mm long [place] drug-eluting stent.  Right coronary artery proximal 70% stenosis to residual normal appearing lumen placing a 2.5x12 mm long [place] drug-eluting stent.  (1-28-10) 2.5 mm RUKHSANA mid RCA      Hyperlipidemia     Smoker     Displacement of cervical intervertebral disc without myelopathy 12/06/2004    Spinal stenosis in cervical region 12/06/2004       Social History     Socioeconomic History    Marital status:      Spouse name: None    Number of children: None    Years of education: None    Highest education level: None   Tobacco Use    Smoking status: Every Day     Current packs/day: 1.00     Average packs/day: 1 pack/day for 65.0 years (65.0 ttl pk-yrs)     Types: Cigarettes     Start date: 3/5/1959    Smokeless tobacco: Never   Vaping Use

## 2024-05-01 ENCOUNTER — HOSPITAL ENCOUNTER (OUTPATIENT)
Dept: CARDIOLOGY | Age: 74
Discharge: HOME | End: 2024-05-03
Attending: INTERNAL MEDICINE
Payer: MEDICARE

## 2024-05-01 VITALS
DIASTOLIC BLOOD PRESSURE: 58 MMHG | SYSTOLIC BLOOD PRESSURE: 114 MMHG | HEIGHT: 67 IN | WEIGHT: 142 LBS | BODY MASS INDEX: 22.29 KG/M2

## 2024-05-01 DIAGNOSIS — R06.09 DOE (DYSPNEA ON EXERTION): ICD-10-CM

## 2024-05-01 DIAGNOSIS — I35.0 NONRHEUMATIC AORTIC VALVE STENOSIS: ICD-10-CM

## 2024-05-01 LAB
ECHO AO ASC DIAM: 2.3 CM
ECHO AO ASCENDING AORTA INDEX: 1.31 CM/M2
ECHO AV AREA PEAK VELOCITY: 1.1 CM2
ECHO AV AREA VTI: 1.2 CM2
ECHO AV AREA/BSA PEAK VELOCITY: 0.6 CM2/M2
ECHO AV AREA/BSA VTI: 0.7 CM2/M2
ECHO AV CUSP MM: 1.5 CM
ECHO AV MEAN GRADIENT: 16 MMHG
ECHO AV MEAN VELOCITY: 1.9 M/S
ECHO AV PEAK GRADIENT: 30 MMHG
ECHO AV PEAK VELOCITY: 2.8 M/S
ECHO AV VELOCITY RATIO: 0.36
ECHO AV VTI: 66.1 CM
ECHO BSA: 1.74 M2
ECHO EST RA PRESSURE: 3 MMHG
ECHO LA DIAMETER INDEX: 1.83 CM/M2
ECHO LA DIAMETER: 3.2 CM
ECHO LA VOL A-L A2C: 58 ML (ref 18–58)
ECHO LA VOL A-L A4C: 59 ML (ref 18–58)
ECHO LA VOL MOD A2C: 55 ML (ref 18–58)
ECHO LA VOL MOD A4C: 56 ML (ref 18–58)
ECHO LA VOLUME AREA LENGTH: 59 ML
ECHO LA VOLUME INDEX A-L A2C: 33 ML/M2 (ref 16–34)
ECHO LA VOLUME INDEX A-L A4C: 34 ML/M2 (ref 16–34)
ECHO LA VOLUME INDEX AREA LENGTH: 34 ML/M2 (ref 16–34)
ECHO LA VOLUME INDEX MOD A2C: 31 ML/M2 (ref 16–34)
ECHO LA VOLUME INDEX MOD A4C: 32 ML/M2 (ref 16–34)
ECHO LV EF PHYSICIAN: 70 %
ECHO LV FRACTIONAL SHORTENING: 40 % (ref 28–44)
ECHO LV INTERNAL DIMENSION DIASTOLE INDEX: 2.86 CM/M2
ECHO LV INTERNAL DIMENSION DIASTOLIC: 5 CM (ref 4.2–5.9)
ECHO LV INTERNAL DIMENSION SYSTOLIC INDEX: 1.71 CM/M2
ECHO LV INTERNAL DIMENSION SYSTOLIC: 3 CM
ECHO LV ISOVOLUMETRIC RELAXATION TIME (IVRT): 78.4 MS
ECHO LV IVSD: 0.6 CM (ref 0.6–1)
ECHO LV IVSS: 1.1 CM
ECHO LV MASS 2D: 94.9 G (ref 88–224)
ECHO LV MASS INDEX 2D: 54.2 G/M2 (ref 49–115)
ECHO LV POSTERIOR WALL DIASTOLIC: 0.6 CM (ref 0.6–1)
ECHO LV POSTERIOR WALL SYSTOLIC: 1.1 CM
ECHO LV RELATIVE WALL THICKNESS RATIO: 0.24
ECHO LVOT AREA: 3.1 CM2
ECHO LVOT AV VTI INDEX: 0.39
ECHO LVOT DIAM: 2 CM
ECHO LVOT MEAN GRADIENT: 2 MMHG
ECHO LVOT PEAK GRADIENT: 4 MMHG
ECHO LVOT PEAK VELOCITY: 1 M/S
ECHO LVOT STROKE VOLUME INDEX: 45.9 ML/M2
ECHO LVOT SV: 80.4 ML
ECHO LVOT VTI: 25.6 CM
ECHO MV "A" WAVE DURATION: 66.9 MSEC
ECHO MV A VELOCITY: 0.96 M/S
ECHO MV AREA PHT: 2.3 CM2
ECHO MV AREA VTI: 2.1 CM2
ECHO MV E DECELERATION TIME (DT): 228.4 MS
ECHO MV E VELOCITY: 0.85 M/S
ECHO MV E/A RATIO: 0.89
ECHO MV EROA PISA: 0 CM2
ECHO MV LVOT VTI INDEX: 1.51
ECHO MV MAX VELOCITY: 1.3 M/S
ECHO MV MEAN GRADIENT: 2 MMHG
ECHO MV MEAN VELOCITY: 0.6 M/S
ECHO MV PEAK GRADIENT: 6 MMHG
ECHO MV PRESSURE HALF TIME (PHT): 96.4 MS
ECHO MV REGURGITANT ALIASING (NYQUIST) VELOCITY: 31 CM/S
ECHO MV REGURGITANT RADIUS PISA: 0.31 CM
ECHO MV REGURGITANT VELOCITY PISA: 4.5 M/S
ECHO MV REGURGITANT VOLUME PISA: 6.11 ML
ECHO MV REGURGITANT VTIA: 146.9 CM
ECHO MV VTI: 38.6 CM
ECHO PV MAX VELOCITY: 0.9 M/S
ECHO PV MEAN GRADIENT: 2 MMHG
ECHO PV MEAN VELOCITY: 0.6 M/S
ECHO PV PEAK GRADIENT: 3 MMHG
ECHO PV VTI: 20.5 CM
ECHO PVEIN A DURATION: 76.1 MS
ECHO PVEIN A VELOCITY: 0.4 M/S
ECHO PVEIN PEAK D VELOCITY: 0.4 M/S
ECHO PVEIN PEAK S VELOCITY: 0.6 M/S
ECHO PVEIN S/D RATIO: 1.5
ECHO RIGHT VENTRICULAR SYSTOLIC PRESSURE (RVSP): 48 MMHG
ECHO TV REGURGITANT MAX VELOCITY: 3.37 M/S
ECHO TV REGURGITANT PEAK GRADIENT: 45 MMHG

## 2024-05-01 PROCEDURE — 93306 TTE W/DOPPLER COMPLETE: CPT

## 2024-05-01 PROCEDURE — 93306 TTE W/DOPPLER COMPLETE: CPT | Performed by: INTERNAL MEDICINE

## 2024-05-02 ENCOUNTER — TELEPHONE (OUTPATIENT)
Dept: CARDIOLOGY CLINIC | Age: 74
End: 2024-05-02

## 2024-05-02 NOTE — TELEPHONE ENCOUNTER
----- Message from Reji Salcedo, DO sent at 5/2/2024  1:19 PM EDT -----  Let him know that the heart function is good.  The moderate aortic stenosis is unchanged/still only moderate.

## 2024-06-12 ENCOUNTER — OFFICE VISIT (OUTPATIENT)
Dept: SURGERY | Age: 74
End: 2024-06-12
Payer: MEDICARE

## 2024-06-12 VITALS — SYSTOLIC BLOOD PRESSURE: 126 MMHG | HEART RATE: 75 BPM | DIASTOLIC BLOOD PRESSURE: 78 MMHG

## 2024-06-12 DIAGNOSIS — Z12.11 COLON CANCER SCREENING: Primary | ICD-10-CM

## 2024-06-12 DIAGNOSIS — Z86.010 HISTORY OF COLON POLYPS: ICD-10-CM

## 2024-06-12 PROBLEM — Z86.0100 HISTORY OF COLON POLYPS: Status: ACTIVE | Noted: 2024-06-12

## 2024-06-12 PROCEDURE — 99202 OFFICE O/P NEW SF 15 MIN: CPT | Performed by: SURGERY

## 2024-06-12 RX ORDER — SODIUM CHLORIDE 0.9 % (FLUSH) 0.9 %
10 SYRINGE (ML) INJECTION PRN
OUTPATIENT
Start: 2024-06-12

## 2024-06-12 RX ORDER — SODIUM CHLORIDE 0.9 % (FLUSH) 0.9 %
10 SYRINGE (ML) INJECTION EVERY 12 HOURS SCHEDULED
OUTPATIENT
Start: 2024-06-12

## 2024-06-12 RX ORDER — SODIUM CHLORIDE 9 MG/ML
25 INJECTION, SOLUTION INTRAVENOUS PRN
OUTPATIENT
Start: 2024-06-12

## 2024-06-12 ASSESSMENT — ENCOUNTER SYMPTOMS
RESPIRATORY NEGATIVE: 1
GASTROINTESTINAL NEGATIVE: 1

## 2024-06-12 NOTE — H&P
CC:  Colorectal cancer screening/surveillance    Assessment:  1. Colon cancer screening    2. History of colon polyps           Plan:  Schedule for colonoscopy with possible biopsy, cauterization, or polypectomy   Hold plavix 5 days prior to scope  Ok to remain on aspirin      HPI  Jorge A Orona presents for colorectal cancer screening.  The patient's most recent colonoscopy was completed in  2012. and The findings were polyps..  The patient reports no abdominal pain, change in stool caliber, blood in stool, rectal bleeding, or weight loss.  There is no family history of colorectal cancer..    Past Medical History:   Diagnosis Date    Agent orange exposure     CAD (coronary artery disease)     Fracture, ribs     HTN (hypertension)     Hyperlipidemia     Lumbar spinal stenosis 11/18/2002    L3-4, L4-5, L5-S1    PVD (peripheral vascular disease) (HCC)     Right carotid endarterectomy 10/3/07, left carotid occlusion.    Smoker     Synovial cyst     L4-5 to the left    Type II or unspecified type diabetes mellitus without mention of complication, not stated as uncontrolled      Past Surgical History:   Procedure Laterality Date    CAROTID ENDARTERECTOMY  10/03/2007    COLONOSCOPY  06/2012    CORONARY ANGIOPLASTY  12/16/2008    DIAGNOSTIC CARDIAC CATH LAB PROCEDURE  12/16/2008    NECK SURGERY      NOSE SURGERY      VASECTOMY       Family History   Problem Relation Age of Onset    Heart Attack Father      Social History     Tobacco Use    Smoking status: Every Day     Current packs/day: 1.00     Average packs/day: 1 pack/day for 65.3 years (65.3 ttl pk-yrs)     Types: Cigarettes     Start date: 3/5/1959    Smokeless tobacco: Never   Vaping Use    Vaping Use: Never used   Substance Use Topics    Alcohol use: Yes     Alcohol/week: 3.0 standard drinks of alcohol     Types: 3 Standard drinks or equivalent per week     Comment: very little    Drug use: No     Prior to Admission medications    Medication Sig Start Date End Date

## 2024-06-14 ENCOUNTER — PREP FOR PROCEDURE (OUTPATIENT)
Dept: SURGERY | Age: 74
End: 2024-06-14

## 2024-06-14 ENCOUNTER — TELEPHONE (OUTPATIENT)
Dept: SURGERY | Age: 74
End: 2024-06-14

## 2024-06-14 DIAGNOSIS — Z12.11 SCREEN FOR COLON CANCER: ICD-10-CM

## 2024-06-14 NOTE — TELEPHONE ENCOUNTER
Scheduled pt for Colonoscopy 24 at 1:45PM. Pt needs to arrive at [facility] at 12:30PM. Patient confirmed date and time, address and directions given in office. Prep instructions given in office, patient understood.      Prior Authorization Form:      DEMOGRAPHICS:                     Patient Name:  James Bella  Patient :  1950            Insurance:  Payor: [facility] MEDICARE / Plan: ANTHEM MEDIBLUE ESSENTIAL/PLUS / Product Type: *No Product type* /   Insurance ID Number:    Payer/Plan Subscr  Sex Relation Sub. Ins. ID Effective Group Num   1. BCBS MEDICARE* JAMES BELLA 1950 Male Self [id number] [facility]                                    [id number]         DIAGNOSIS & PROCEDURE:                       Procedure/Operation: COLONOSCOPY           CPT Code: 84294    Diagnosis:  SCREENING, HX OF COLON POLYPS    ICD10 Code: Z12.11, Z86.010    Location:  [facility]    Surgeon:  KENDRA STALLWORTH    SCHEDULING INFORMATION:                          Date: 24    Time: 1:45PM              Anesthesia:  LMAC                                                       Status:  OUTPATIENT       Special Comments:  N/A       Electronically signed by Yomaira Lebron MA on 2024 at 9:55 AM

## 2024-06-19 ENCOUNTER — TELEPHONE (OUTPATIENT)
Dept: CARDIOLOGY CLINIC | Age: 74
End: 2024-06-19

## 2024-06-19 NOTE — TELEPHONE ENCOUNTER
Patient needs cardiac clearance for cataract surgery with IOL implant on 7/24/24 and 8/7/24 by Dr. Serna at an ASC under MAC/Topical. Left message for patient to call the office to assess for any cardiac symptoms since last visit in March 2024.

## 2024-06-20 NOTE — TELEPHONE ENCOUNTER
He performs more than 4 METS of physical activities and has no cardiac symptoms he also had a negative stress test 10-22.  Therefore he will be classified as low risk for perioperative ischemic cardiac events for the cataract procedure and colonoscopy.  No further preop cardiac testing is needed.  Okay to hold the Plavix for 5 days before the procedure if needed.  I recommend not missing any doses of aspirin.

## 2024-06-20 NOTE — TELEPHONE ENCOUNTER
Patient denies any chest pain, shortness of breath or palpitations since last visit in March 2024.  Patient is able to complete all activities of daily living, including; climbing one flight of stairs and walking one block without cardiac symptoms. Patient on Aspirin and Plavix.      Patient also states he is scheduled for a colonoscopy on 7/29/24.

## 2024-06-21 NOTE — TELEPHONE ENCOUNTER
Patient notified of Dr. Salcedo's risk assessment/recommendation. Note faxed to [facility] [phone].

## 2024-07-12 PROBLEM — Z12.11 COLON CANCER SCREENING: Status: RESOLVED | Noted: 2024-06-12 | Resolved: 2024-07-12

## 2024-07-14 PROBLEM — Z12.11 SCREEN FOR COLON CANCER: Status: RESOLVED | Noted: 2024-06-14 | Resolved: 2024-07-14

## 2024-07-23 NOTE — PROGRESS NOTES
[facility] PRE-ADMISSION TESTING   COLONOSCOPY INSTRUCTIONS  PAT- Phone Number: [phone]    COLONOSCOPY INSTRUCTIONS:     [x] Bowel Prep instructions reviewed - any questions regarding your prep, please contact surgeon's office.  [x] Colonoscopy: 1-2 days prior: No solid foods. Clear liquids only - nothing red or purple in color.  [x] Antibacterial Soap Shower Night before AND the morning of procedure.  [x] No solid food after midnight. You may have SIPS of clear liquids up until 2 hours before your arrival time to the hospital.   [x] Do not wear makeup, lotions, powders, deodorant.   [x] No tobacco products, illegal drugs, or alcohol within 24 hours of your surgery.  [x] Jewelry or valuables should not be brought to the hospital. All body and/or tongue piercing's must be removed prior to arriving to hospital. No contact lens or hair pins.   [] Urine Pregnancy test will be preformed the day of surgery. A specimen sample may be brought from home.  [x] Arrange transportation with a responsible adult  to and from the hospital. If you do not have a responsible adult  to transport you, you will need to make arrangements with a medical transportation company. Arrange for someone to be with you for the remainder of the day and for 24 hours after your procedure due to having had anesthesia.    -Who will be your  for transportation? Wife - Carmella  -Who will be staying with you for 24 hrs after your procedure? Wife - Carmella  [x] Bring insurance card and photo ID.  [] Bring copy of living will or healthcare power of  papers to be placed in your electronic record.    PARKING INSTRUCTIONS:     [x] ARRIVAL DATE & TIME: 7/29/24 at 1215 pm  [x] Times are subject to change. We will contact you the business day before surgery if that were to occur.  [x] Enter into the [facility] Entrance. Two people may accompany you. Masks are not required.  [x] Parking Lot \"I\" is

## 2024-07-29 ENCOUNTER — HOSPITAL ENCOUNTER (OUTPATIENT)
Age: 74
Setting detail: OUTPATIENT SURGERY
Discharge: HOME | End: 2024-07-29
Attending: SURGERY | Admitting: SURGERY
Payer: OTHER GOVERNMENT

## 2024-07-29 ENCOUNTER — ANESTHESIA (OUTPATIENT)
Dept: ENDOSCOPY | Age: 74
End: 2024-07-29
Payer: OTHER GOVERNMENT

## 2024-07-29 ENCOUNTER — ANESTHESIA EVENT (OUTPATIENT)
Dept: ENDOSCOPY | Age: 74
End: 2024-07-29
Payer: OTHER GOVERNMENT

## 2024-07-29 VITALS
OXYGEN SATURATION: 97 % | TEMPERATURE: 98.4 F | HEIGHT: 67 IN | RESPIRATION RATE: 20 BRPM | DIASTOLIC BLOOD PRESSURE: 77 MMHG | BODY MASS INDEX: 21.97 KG/M2 | WEIGHT: 140 LBS | HEART RATE: 68 BPM | SYSTOLIC BLOOD PRESSURE: 125 MMHG

## 2024-07-29 DIAGNOSIS — Z01.812 PRE-OPERATIVE LABORATORY EXAMINATION: Primary | ICD-10-CM

## 2024-07-29 DIAGNOSIS — Z12.11 SCREEN FOR COLON CANCER: ICD-10-CM

## 2024-07-29 PROBLEM — K63.5 COLON POLYP: Status: ACTIVE | Noted: 2024-07-29

## 2024-07-29 LAB — GLUCOSE BLD-MCNC: 122 MG/DL (ref 74–99)

## 2024-07-29 PROCEDURE — 2709999900 HC NON-CHARGEABLE SUPPLY: Performed by: SURGERY

## 2024-07-29 PROCEDURE — 3700000000 HC ANESTHESIA ATTENDED CARE: Performed by: SURGERY

## 2024-07-29 PROCEDURE — 7100000010 HC PHASE II RECOVERY - FIRST 15 MIN: Performed by: SURGERY

## 2024-07-29 PROCEDURE — 45385 COLONOSCOPY W/LESION REMOVAL: CPT | Performed by: SURGERY

## 2024-07-29 PROCEDURE — 3609010600 HC COLONOSCOPY POLYPECTOMY SNARE/COLD BIOPSY: Performed by: SURGERY

## 2024-07-29 PROCEDURE — 6360000002 HC RX W HCPCS: Performed by: ANESTHESIOLOGIST ASSISTANT

## 2024-07-29 PROCEDURE — 3700000001 HC ADD 15 MINUTES (ANESTHESIA): Performed by: SURGERY

## 2024-07-29 PROCEDURE — 7100000011 HC PHASE II RECOVERY - ADDTL 15 MIN: Performed by: SURGERY

## 2024-07-29 PROCEDURE — 88305 TISSUE EXAM BY PATHOLOGIST: CPT

## 2024-07-29 PROCEDURE — 2580000003 HC RX 258: Performed by: SURGERY

## 2024-07-29 PROCEDURE — 82962 GLUCOSE BLOOD TEST: CPT

## 2024-07-29 RX ORDER — SODIUM CHLORIDE 0.9 % (FLUSH) 0.9 %
10 SYRINGE (ML) INJECTION EVERY 12 HOURS SCHEDULED
Status: DISCONTINUED | OUTPATIENT
Start: 2024-07-29 | End: 2024-07-29 | Stop reason: HOSPADM

## 2024-07-29 RX ORDER — SODIUM CHLORIDE 9 MG/ML
25 INJECTION, SOLUTION INTRAVENOUS PRN
Status: DISCONTINUED | OUTPATIENT
Start: 2024-07-29 | End: 2024-07-29 | Stop reason: HOSPADM

## 2024-07-29 RX ORDER — SODIUM CHLORIDE 0.9 % (FLUSH) 0.9 %
10 SYRINGE (ML) INJECTION PRN
Status: DISCONTINUED | OUTPATIENT
Start: 2024-07-29 | End: 2024-07-29 | Stop reason: HOSPADM

## 2024-07-29 RX ORDER — PROPOFOL 10 MG/ML
INJECTION, EMULSION INTRAVENOUS PRN
Status: DISCONTINUED | OUTPATIENT
Start: 2024-07-29 | End: 2024-07-29 | Stop reason: SDUPTHER

## 2024-07-29 RX ADMIN — PROPOFOL 330 MG: 10 INJECTION, EMULSION INTRAVENOUS at 13:23

## 2024-07-29 RX ADMIN — SODIUM CHLORIDE: 9 INJECTION, SOLUTION INTRAVENOUS at 13:18

## 2024-07-29 ASSESSMENT — PAIN - FUNCTIONAL ASSESSMENT: PAIN_FUNCTIONAL_ASSESSMENT: NONE - DENIES PAIN

## 2024-07-29 ASSESSMENT — ENCOUNTER SYMPTOMS
RESPIRATORY NEGATIVE: 1
GASTROINTESTINAL NEGATIVE: 1

## 2024-07-29 ASSESSMENT — LIFESTYLE VARIABLES: SMOKING_STATUS: 1

## 2024-07-29 NOTE — ANESTHESIA POSTPROCEDURE EVALUATION
Department of Anesthesiology  Postprocedure Note    Patient: Jorge A Orona  MRN: [id number]  YOB: 1950  Date of evaluation: 7/30/2024    Procedure Summary       Date: 07/29/24 Room / Location: [place] [address] / [facility]    Anesthesia Start: 1318 Anesthesia Stop:     Procedure: COLONOSCOPY POLYPECTOMY SNARE/BIOPSY Diagnosis:       Screen for colon cancer      (Screen for colon cancer [Z12.11])    Surgeons: Gio Lopez MD Responsible Provider: Cleveland Morfin MD    Anesthesia Type: MAC ASA Status: 3            Anesthesia Type: No value filed.    Sridhar Phase I: Sridhar Score: 10    Sridhar Phase II: Sridhar Score: 10    Anesthesia Post Evaluation    Patient location during evaluation: PACU  Patient participation: complete - patient participated  Level of consciousness: awake  Pain score: 3  Airway patency: patent  Nausea & Vomiting: no nausea and no vomiting  Cardiovascular status: blood pressure returned to baseline  Respiratory status: acceptable  Hydration status: euvolemic      No notable events documented.

## 2024-07-29 NOTE — ANESTHESIA PRE PROCEDURE
packs/day: 1 pack/day for 65.4 years (65.4 ttl pk-yrs)     Types: Cigarettes     Start date: 3/5/1959    Smokeless tobacco: Never   Substance Use Topics    Alcohol use: Yes     Alcohol/week: 3.0 standard drinks of alcohol     Types: 3 Standard drinks or equivalent per week     Comment: very little                                Ready to quit: Not Answered  Counseling given: Not Answered      Vital Signs (Current):   Vitals:    07/23/24 1107 07/29/24 1240   BP:  (!) 151/72   Pulse:  70   Resp:  18   Temp:  36.4 °C (97.5 °F)   TempSrc:  Temporal   SpO2:  96%   Weight: 63.5 kg (140 lb) 63.5 kg (140 lb)   Height:  1.702 m (5' 7\")                                              BP Readings from Last 3 Encounters:   07/29/24 (!) 151/72   07/17/24 114/68   06/19/24 129/70       NPO Status: Time of last liquid consumption: 1000 (water)                        Time of last solid consumption: 1000                        Date of last liquid consumption: 07/29/24                        Date of last solid food consumption: 07/28/24    BMI:   Wt Readings from Last 3 Encounters:   07/29/24 63.5 kg (140 lb)   07/17/24 63.5 kg (140 lb)   06/19/24 62.6 kg (138 lb)     Body mass index is 21.93 kg/m².    CBC:   Lab Results   Component Value Date/Time    WBC 13.9 05/29/2023 12:27 AM    RBC 4.43 05/29/2023 12:27 AM    HGB 13.5 05/29/2023 12:27 AM    HCT 40.8 05/29/2023 12:27 AM    MCV 92.1 05/29/2023 12:27 AM    RDW 13.9 05/29/2023 12:27 AM     05/29/2023 12:27 AM       CMP:   Lab Results   Component Value Date/Time     05/29/2023 12:27 AM    K 4.9 05/29/2023 12:27 AM     05/29/2023 12:27 AM    CO2 20 05/29/2023 12:27 AM    BUN 21 05/29/2023 12:27 AM    CREATININE 1.2 05/29/2023 12:27 AM    LABGLOM >60 05/29/2023 12:27 AM    GLUCOSE 138 05/29/2023 12:27 AM    CALCIUM 9.3 05/29/2023 12:27 AM    BILITOT 0.6 05/29/2023 12:27 AM    ALKPHOS 154 05/29/2023 12:27 AM    AST 13 05/29/2023 12:27 AM    ALT 9 05/29/2023 12:27 AM

## 2024-07-29 NOTE — H&P
CC:  Colorectal cancer screening/surveillance    Assessment:  History of colon polyps      Plan:  Schedule for colonoscopy with possible biopsy, cauterization, or polypectomy   Hold plavix 5 days prior to scope  Ok to remain on aspirin      HPI  Jorge A Orona presents for colorectal cancer screening.  The patient's most recent colonoscopy was completed in  2012. and The findings were polyps..  The patient reports no abdominal pain, change in stool caliber, blood in stool, rectal bleeding, or weight loss.  There is no family history of colorectal cancer..    Past Medical History:   Diagnosis Date    Agent orange exposure     CAD (coronary artery disease)     Fracture, ribs     HTN (hypertension)     Hyperlipidemia     Lumbar spinal stenosis 11/18/2002    L3-4, L4-5, L5-S1    PVD (peripheral vascular disease) (HCC)     Right carotid endarterectomy 10/3/07, left carotid occlusion.    Smoker     Synovial cyst     L4-5 to the left    Type II or unspecified type diabetes mellitus without mention of complication, not stated as uncontrolled      Past Surgical History:   Procedure Laterality Date    CAROTID ENDARTERECTOMY  10/03/2007    COLONOSCOPY  06/2012    CORONARY ANGIOPLASTY  12/16/2008    DIAGNOSTIC CARDIAC CATH LAB PROCEDURE  12/16/2008    NECK SURGERY      NOSE SURGERY      VASECTOMY       Family History   Problem Relation Age of Onset    Heart Attack Father      Social History     Tobacco Use    Smoking status: Every Day     Current packs/day: 1.00     Average packs/day: 1 pack/day for 65.4 years (65.4 ttl pk-yrs)     Types: Cigarettes     Start date: 3/5/1959    Smokeless tobacco: Never   Vaping Use    Vaping Use: Never used   Substance Use Topics    Alcohol use: Yes     Alcohol/week: 3.0 standard drinks of alcohol     Types: 3 Standard drinks or equivalent per week     Comment: very little    Drug use: No     Prior to Admission medications    Medication Sig Start Date End Date Taking? Authorizing Provider    HYDROcodone-acetaminophen (NORCO)  MG per tablet Take 1 tablet by mouth 5 times daily for 30 days. Intended supply: 30 days Max Daily Amount: 5 tablets 7/19/24 8/18/24  Bairon Zapata MD   fluticasone-umeclidin-vilant (TRELEGY ELLIPTA) 200-62.5-25 MCG/ACT AEPB inhaler Inhale 1 puff into the lungs daily    Mary Alice Garcia MD   amLODIPine (NORVASC) 10 MG tablet Take 1 tablet by mouth daily    Mary Alice Garcia MD   Magnesium Oxide 420 MG TABS Take 1 tablet by mouth daily 4/10/23   Mary Alice Garcia MD   famotidine (PEPCID) 20 MG tablet Take 1 tablet by mouth daily 9/30/20   Mary Alice Garcia MD   cyclobenzaprine (FLEXERIL) 10 MG tablet Take 1 tablet by mouth daily as needed for Muscle spasms 4/11/19   Mary Alice Garcia MD   Cholecalciferol (VITAMIN D PO) Take 1 tablet by mouth daily    Mary Alice Garcia MD   aspirin 81 MG tablet Take 1 tablet by mouth in the morning and at bedtime    Mary Ailce Garcia MD   atorvastatin (LIPITOR) 80 MG tablet Take 1 tablet by mouth daily    Mary Alice Garcia MD   metFORMIN (GLUCOPHAGE) 500 MG tablet Take 0.5 tablets by mouth 2 times daily (with meals) Indications: takes 1/2 of a pill bid    Mary Alice Garcia MD   gabapentin (NEURONTIN) 300 MG capsule Take 1 capsule by mouth 3 times daily.    Mary Alice Garcia MD   clopidogrel (PLAVIX) 75 MG tablet Take 1 tablet by mouth daily    Mary Alice Garcia MD     Allergies   Allergen Reactions    Simvastatin Myalgia       Review of Systems  Review of Systems   Respiratory: Negative.     Cardiovascular: Negative.    Gastrointestinal: Negative.    Neurological: Negative.          Physical Exam   Wt 63.5 kg (140 lb)   BMI 21.93 kg/m²   General - no acute distress, comfortable   Head - normocephalic,atraumatic  Eyes - pupils symmetric, conjunctivae pink  ENT - nose/ears appear normal,  fair dentition, moist oral mucosa   Neck - no cervical adenopathy, mass, thyromegaly   Respiratory - normal

## 2024-08-05 LAB — SURGICAL PATHOLOGY REPORT: NORMAL

## 2024-08-06 NOTE — RESULT ENCOUNTER NOTE
Your recent colonoscopy polyp biopsies demonstrate multiple adenomatous polyps.  You should have another colonoscopy done in 3 years (August 2027)

## 2024-08-28 PROBLEM — Z12.11 SCREEN FOR COLON CANCER: Status: RESOLVED | Noted: 2024-06-14 | Resolved: 2024-08-28

## 2024-10-29 ENCOUNTER — TELEPHONE (OUTPATIENT)
Dept: CARDIOLOGY CLINIC | Age: 74
End: 2024-10-29

## 2024-10-29 NOTE — TELEPHONE ENCOUNTER
Per call to [facility] s/w Danielito no referral on file for Cardiology. [facility]

## 2024-10-31 ENCOUNTER — OFFICE VISIT (OUTPATIENT)
Dept: CARDIOLOGY CLINIC | Age: 74
End: 2024-10-31

## 2024-10-31 VITALS
RESPIRATION RATE: 18 BRPM | HEART RATE: 63 BPM | HEIGHT: 67 IN | DIASTOLIC BLOOD PRESSURE: 56 MMHG | WEIGHT: 132.8 LBS | SYSTOLIC BLOOD PRESSURE: 122 MMHG | BODY MASS INDEX: 20.84 KG/M2

## 2024-10-31 DIAGNOSIS — I35.0 NONRHEUMATIC AORTIC VALVE STENOSIS: Primary | ICD-10-CM

## 2024-10-31 NOTE — PROGRESS NOTES
A Yeyo states that He does household chores, goes up the stairs, does yard work & goes shopping.    He has chronic dyspnea on exertion this only occurs with higher levels of activities and has not changed for years.  He continues to smoke.  He denies any chest discomfort, orthopnea/PND, or lower extremity edema.  He denies any palpitations.    REVIEW OF SYSTEMS:  As above. Patient does not complain of any fever, chills, nausea, vomiting or diarrhea. No focal, motor or neurological deficits. No changes in his/her vision, hearing, bowel or bladder habits.  He is not known to have a history of thyroid problems.  No recent nose bleeds.    PHYSICAL EXAM:  Vitals:    10/31/24 0950   BP: (!) 122/56   Pulse: 63   Resp: 18   Weight: 60.2 kg (132 lb 12.8 oz)   Height: 1.702 m (5' 7\")       GENERAL:  He is alert and oriented x 3, communicates well, in no distress.   NECK:  No masses, trachea is mid position.  Supple, full ROM, no JVD.  Left greater than right bruits.  No palpable thyromegaly or lymphadenopathy.   HEART: Distant.  Regular rate and rhythm. Normal S1 and S2. There is a II/VI mid peaking systolic ejection murmur.  LUNGS: Poor air movement.  Clear to auscultation bilaterally.  No use of accessory muscles.  symmetrical excursion.  ABDOMEN:  Soft, non-tender.  Normal bowel sounds.  EXTREMITIES:  Full ROM x 4.  No bilateral lower extremity edema on exam.  Good distal pulses.   EYES:  Extraocular muscles intact.  PERRL.  Normal lids & conjunctiva.  ENT:  Nares are clear & not bleeding.  Moist mucosa.  Normal lips formation.  No external masses   NEURO: no tremors, full ROM x 4, EOMI.  SKIN:  Warm, dry and intact.  Normal turgor.        EKG: Sinus rhythm, 63 bpm, nl axis, nonspecific ST - T wave changes.      Assessment:   Coronary artery disease as outlined above.  No symptoms of recurring ischemia.  Negative stress test 2022.  Moderate aortic stenosis.  Unchanged on echo 5-24.  Hypertension, well controled at this

## 2025-02-15 ENCOUNTER — APPOINTMENT (OUTPATIENT)
Dept: CT IMAGING | Age: 75
End: 2025-02-15
Payer: OTHER GOVERNMENT

## 2025-02-15 ENCOUNTER — APPOINTMENT (OUTPATIENT)
Dept: GENERAL RADIOLOGY | Age: 75
End: 2025-02-15
Payer: OTHER GOVERNMENT

## 2025-02-15 ENCOUNTER — HOSPITAL ENCOUNTER (EMERGENCY)
Age: 75
Discharge: HOME | End: 2025-02-15
Attending: STUDENT IN AN ORGANIZED HEALTH CARE EDUCATION/TRAINING PROGRAM
Payer: OTHER GOVERNMENT

## 2025-02-15 VITALS
HEART RATE: 80 BPM | RESPIRATION RATE: 18 BRPM | OXYGEN SATURATION: 95 % | DIASTOLIC BLOOD PRESSURE: 69 MMHG | SYSTOLIC BLOOD PRESSURE: 121 MMHG | TEMPERATURE: 98.8 F

## 2025-02-15 DIAGNOSIS — J10.1 INFLUENZA A: Primary | ICD-10-CM

## 2025-02-15 LAB
ALBUMIN SERPL-MCNC: 3.8 G/DL (ref 3.5–5.2)
ALP SERPL-CCNC: 130 U/L (ref 40–129)
ALT SERPL-CCNC: 23 U/L (ref 0–40)
ANION GAP SERPL CALCULATED.3IONS-SCNC: 12 MMOL/L (ref 7–16)
AST SERPL-CCNC: 18 U/L (ref 0–39)
BASOPHILS # BLD: 0.02 K/UL (ref 0–0.2)
BASOPHILS NFR BLD: 0 % (ref 0–2)
BILIRUB SERPL-MCNC: 0.5 MG/DL (ref 0–1.2)
BILIRUB UR QL STRIP: NEGATIVE
BUN SERPL-MCNC: 19 MG/DL (ref 6–23)
CALCIUM SERPL-MCNC: 9.2 MG/DL (ref 8.6–10.2)
CHLORIDE SERPL-SCNC: 104 MMOL/L (ref 98–107)
CLARITY UR: CLEAR
CO2 SERPL-SCNC: 24 MMOL/L (ref 22–29)
COLOR UR: YELLOW
CREAT SERPL-MCNC: 1.2 MG/DL (ref 0.7–1.2)
EKG ATRIAL RATE: 71 BPM
EKG P AXIS: 85 DEGREES
EKG P-R INTERVAL: 142 MS
EKG Q-T INTERVAL: 396 MS
EKG QRS DURATION: 80 MS
EKG QTC CALCULATION (BAZETT): 430 MS
EKG R AXIS: -36 DEGREES
EKG T AXIS: 71 DEGREES
EKG VENTRICULAR RATE: 71 BPM
EOSINOPHIL # BLD: 0.14 K/UL (ref 0.05–0.5)
EOSINOPHILS RELATIVE PERCENT: 1 % (ref 0–6)
ERYTHROCYTE [DISTWIDTH] IN BLOOD BY AUTOMATED COUNT: 13.8 % (ref 11.5–15)
FLUAV RNA RESP QL NAA+PROBE: DETECTED
FLUBV RNA RESP QL NAA+PROBE: NOT DETECTED
GFR, ESTIMATED: 62 ML/MIN/1.73M2
GLUCOSE SERPL-MCNC: 125 MG/DL (ref 74–99)
GLUCOSE UR STRIP-MCNC: NEGATIVE MG/DL
HCT VFR BLD AUTO: 43.8 % (ref 37–54)
HGB BLD-MCNC: 14.6 G/DL (ref 12.5–16.5)
HGB UR QL STRIP.AUTO: NEGATIVE
IMM GRANULOCYTES # BLD AUTO: 0.05 K/UL (ref 0–0.58)
IMM GRANULOCYTES NFR BLD: 0 % (ref 0–5)
KETONES UR STRIP-MCNC: NEGATIVE MG/DL
LACTATE BLDV-SCNC: 1.2 MMOL/L (ref 0.5–2.2)
LEUKOCYTE ESTERASE UR QL STRIP: NEGATIVE
LIPASE SERPL-CCNC: 39 U/L (ref 13–60)
LYMPHOCYTES NFR BLD: 2.98 K/UL (ref 1.5–4)
LYMPHOCYTES RELATIVE PERCENT: 26 % (ref 20–42)
MAGNESIUM SERPL-MCNC: 1.9 MG/DL (ref 1.6–2.6)
MCH RBC QN AUTO: 29.6 PG (ref 26–35)
MCHC RBC AUTO-ENTMCNC: 33.3 G/DL (ref 32–34.5)
MCV RBC AUTO: 88.8 FL (ref 80–99.9)
MONOCYTES NFR BLD: 0.89 K/UL (ref 0.1–0.95)
MONOCYTES NFR BLD: 8 % (ref 2–12)
NEUTROPHILS NFR BLD: 65 % (ref 43–80)
NEUTS SEG NFR BLD: 7.42 K/UL (ref 1.8–7.3)
NITRITE UR QL STRIP: NEGATIVE
PH UR STRIP: 6.5 [PH] (ref 5–8)
PLATELET # BLD AUTO: 402 K/UL (ref 130–450)
PMV BLD AUTO: 8.9 FL (ref 7–12)
POTASSIUM SERPL-SCNC: 4.9 MMOL/L (ref 3.5–5)
PROT SERPL-MCNC: 6.5 G/DL (ref 6.4–8.3)
PROT UR STRIP-MCNC: NEGATIVE MG/DL
RBC # BLD AUTO: 4.93 M/UL (ref 3.8–5.8)
RBC #/AREA URNS HPF: NORMAL /HPF
SARS-COV-2 RNA RESP QL NAA+PROBE: NOT DETECTED
SODIUM SERPL-SCNC: 140 MMOL/L (ref 132–146)
SOURCE: ABNORMAL
SP GR UR STRIP: 1.01 (ref 1–1.03)
SPECIMEN DESCRIPTION: ABNORMAL
TROPONIN I SERPL HS-MCNC: 13 NG/L (ref 0–11)
TROPONIN I SERPL HS-MCNC: 14 NG/L (ref 0–11)
UROBILINOGEN UR STRIP-ACNC: 0.2 EU/DL (ref 0–1)
WBC #/AREA URNS HPF: NORMAL /HPF
WBC OTHER # BLD: 11.5 K/UL (ref 4.5–11.5)

## 2025-02-15 PROCEDURE — 93005 ELECTROCARDIOGRAM TRACING: CPT | Performed by: STUDENT IN AN ORGANIZED HEALTH CARE EDUCATION/TRAINING PROGRAM

## 2025-02-15 PROCEDURE — 81001 URINALYSIS AUTO W/SCOPE: CPT

## 2025-02-15 PROCEDURE — 71045 X-RAY EXAM CHEST 1 VIEW: CPT

## 2025-02-15 PROCEDURE — 87636 SARSCOV2 & INF A&B AMP PRB: CPT

## 2025-02-15 PROCEDURE — 83735 ASSAY OF MAGNESIUM: CPT

## 2025-02-15 PROCEDURE — 74177 CT ABD & PELVIS W/CONTRAST: CPT

## 2025-02-15 PROCEDURE — 80053 COMPREHEN METABOLIC PANEL: CPT

## 2025-02-15 PROCEDURE — 83690 ASSAY OF LIPASE: CPT

## 2025-02-15 PROCEDURE — 99285 EMERGENCY DEPT VISIT HI MDM: CPT

## 2025-02-15 PROCEDURE — 83605 ASSAY OF LACTIC ACID: CPT

## 2025-02-15 PROCEDURE — 2580000003 HC RX 258: Performed by: STUDENT IN AN ORGANIZED HEALTH CARE EDUCATION/TRAINING PROGRAM

## 2025-02-15 PROCEDURE — 85025 COMPLETE CBC W/AUTO DIFF WBC: CPT

## 2025-02-15 PROCEDURE — 6360000004 HC RX CONTRAST MEDICATION: Performed by: RADIOLOGY

## 2025-02-15 PROCEDURE — 84484 ASSAY OF TROPONIN QUANT: CPT

## 2025-02-15 RX ORDER — DOCUSATE SODIUM 100 MG/1
100 CAPSULE, LIQUID FILLED ORAL 2 TIMES DAILY
Qty: 60 CAPSULE | Refills: 0 | Status: SHIPPED | OUTPATIENT
Start: 2025-02-15 | End: 2025-03-17

## 2025-02-15 RX ORDER — 0.9 % SODIUM CHLORIDE 0.9 %
1000 INTRAVENOUS SOLUTION INTRAVENOUS ONCE
Status: COMPLETED | OUTPATIENT
Start: 2025-02-15 | End: 2025-02-15

## 2025-02-15 RX ORDER — IOPAMIDOL 755 MG/ML
75 INJECTION, SOLUTION INTRAVASCULAR
Status: COMPLETED | OUTPATIENT
Start: 2025-02-15 | End: 2025-02-15

## 2025-02-15 RX ADMIN — SODIUM CHLORIDE 1000 ML: 0.9 INJECTION, SOLUTION INTRAVENOUS at 09:31

## 2025-02-15 RX ADMIN — IOPAMIDOL 75 ML: 755 INJECTION, SOLUTION INTRAVENOUS at 10:25

## 2025-02-15 ASSESSMENT — ENCOUNTER SYMPTOMS
COUGH: 1
PHOTOPHOBIA: 0
CONSTIPATION: 1
SHORTNESS OF BREATH: 0
SORE THROAT: 0
NAUSEA: 0
BACK PAIN: 0
DIARRHEA: 0
ABDOMINAL PAIN: 1

## 2025-02-15 ASSESSMENT — PAIN DESCRIPTION - PAIN TYPE: TYPE: ACUTE PAIN

## 2025-02-15 ASSESSMENT — PAIN DESCRIPTION - LOCATION: LOCATION: ABDOMEN

## 2025-02-15 ASSESSMENT — LIFESTYLE VARIABLES
HOW MANY STANDARD DRINKS CONTAINING ALCOHOL DO YOU HAVE ON A TYPICAL DAY: PATIENT DOES NOT DRINK
HOW OFTEN DO YOU HAVE A DRINK CONTAINING ALCOHOL: NEVER

## 2025-02-15 ASSESSMENT — PAIN DESCRIPTION - ONSET: ONSET: ON-GOING

## 2025-02-15 ASSESSMENT — PAIN SCALES - GENERAL: PAINLEVEL_OUTOF10: 10

## 2025-02-15 ASSESSMENT — PAIN DESCRIPTION - FREQUENCY: FREQUENCY: CONTINUOUS

## 2025-02-15 ASSESSMENT — PAIN DESCRIPTION - DESCRIPTORS: DESCRIPTORS: ACHING;DISCOMFORT;SORE

## 2025-02-15 ASSESSMENT — PAIN - FUNCTIONAL ASSESSMENT: PAIN_FUNCTIONAL_ASSESSMENT: 0-10

## 2025-02-15 NOTE — ED PROVIDER NOTES
[facility] EMERGENCY DEPARTMENT  EMERGENCY DEPARTMENT ENCOUNTER        Pt Name: Jorge A Orona  MRN: [id number]  Birthdate 1950  Date of evaluation: 2/15/2025  Provider: Priscila Gould MD  PCP: Phil Franco MD  Note Started: 9:19 AM EST 2/15/25    HPI  74 y.o. male presenting for cough, abdominal pain, constipation.  Patient was diagnosed with pneumonia last Friday.  He has been on a steroid and antibiotic.  He complains of continued generalized weakness and fatigue.  He complains of cough active green sputum.  He complains of upper abdominal pain and decreased appetite.  He complains of constipation and had only a small bowel movement yesterday.  He states he felt short of breath last week, but this has improved.  He is a smoker.  He denies fever or nausea.      --------------------------------------------- PAST HISTORY ---------------------------------------------  Past Medical History:  has a past medical history of Agent orange exposure, CAD (coronary artery disease), Fracture, ribs, HTN (hypertension), Hyperlipidemia, Lumbar spinal stenosis, PVD (peripheral vascular disease) (HCC), Smoker, Synovial cyst, and Type II or unspecified type diabetes mellitus without mention of complication, not stated as uncontrolled.    Past Surgical History:  has a past surgical history that includes Carotid endarterectomy (10/03/2007); Diagnostic Cardiac Cath Lab Procedure (12/16/2008); Coronary angioplasty (12/16/2008); Vasectomy; Neck surgery; Nose surgery; Colonoscopy (06/2012); and Colonoscopy (N/A, 7/29/2024).    Social History:  reports that he has been smoking cigarettes. He started smoking about 65 years ago. He has a 66 pack-year smoking history. He has never used smokeless tobacco. He reports current alcohol use of about 3.0 standard drinks of alcohol per week. He reports that he does not use drugs.    Family History: family history includes Heart Attack in his father.     The  mg/dL   Lipase   Result Value Ref Range    Lipase 39 13 - 60 U/L   Urinalysis with Microscopic   Result Value Ref Range    Color, UA Yellow Yellow    Turbidity UA Clear Clear    Glucose, Ur NEGATIVE NEGATIVE mg/dL    Bilirubin, Urine NEGATIVE NEGATIVE    Ketones, Urine NEGATIVE NEGATIVE mg/dL    Specific Gravity, UA 1.010 1.005 - 1.030    Urine Hgb NEGATIVE NEGATIVE    pH, Urine 6.5 5.0 - 8.0    Protein, UA NEGATIVE NEGATIVE mg/dL    Urobilinogen, Urine 0.2 0.0 - 1.0 EU/dL    Nitrite, Urine NEGATIVE NEGATIVE    Leukocyte Esterase, Urine NEGATIVE NEGATIVE    WBC, UA 0 TO 5 0 TO 5 /HPF    RBC, UA 0 TO 2 0 TO 2 /HPF   Troponin   Result Value Ref Range    Troponin, High Sensitivity 13 (H) 0 - 11 ng/L   EKG 12 Lead   Result Value Ref Range    Ventricular Rate 71 BPM    Atrial Rate 71 BPM    P-R Interval 142 ms    QRS Duration 80 ms    Q-T Interval 396 ms    QTc Calculation (Bazett) 430 ms    P Axis 85 degrees    R Axis -36 degrees    T Axis 71 degrees       RADIOLOGY:  CT ABDOMEN PELVIS W IV CONTRAST Additional Contrast? None   Final Result   1. No acute intra-abdominal or pelvic process.   2. Mild to moderate stool burden seen diffusely throughout the colon with no   evidence of obvious obstruction.  Findings to suggest mild clinical   presentation of constipation.  Diverticulosis with no evidence of   diverticulitis.   3. Infrarenal abdominal aortic aneurysm measuring 3.2 cm. Ultrasound   follow-up in 3 years is recommended for surveillance.   4. Renal vascular calcifications versus nonobstructing stones.         XR CHEST PORTABLE   Final Result   Chronic changes seen within lung fields with no evidence of acute parenchymal   disease.               ------------------------- NURSING NOTES AND VITALS REVIEWED ---------------------------  Date / Time Roomed:  2/15/2025  9:03 AM  ED Bed Assignment:  11/11    The nursing notes within the ED encounter and vital signs as below have been reviewed.     Patient Vitals for the

## 2025-02-17 LAB
EKG ATRIAL RATE: 71 BPM
EKG P AXIS: 85 DEGREES
EKG P-R INTERVAL: 142 MS
EKG Q-T INTERVAL: 396 MS
EKG QRS DURATION: 80 MS
EKG QTC CALCULATION (BAZETT): 430 MS
EKG R AXIS: -36 DEGREES
EKG T AXIS: 71 DEGREES
EKG VENTRICULAR RATE: 71 BPM

## (undated) DEVICE — DEFENDO AIR WATER SUCTION AND BIOPSY VALVE KIT FOR  OLYMPUS: Brand: DEFENDO AIR/WATER/SUCTION AND BIOPSY VALVE

## (undated) DEVICE — GAUZE,SPONGE,4"X4",8PLY,STRL,LF,10/TRAY: Brand: MEDLINE

## (undated) DEVICE — CONNECTOR IRRIGATION AUXILIARY H2O JET W/ PRT MTL THRD HYDR

## (undated) DEVICE — FORMALIN  10%NBF 60ML PREFLL CONT

## (undated) DEVICE — SNARE VASC L240CM LOOP W10MM SHTH DIA2.4MM RND STIFF CLD

## (undated) DEVICE — TRAP POLYP ETRAP